# Patient Record
Sex: FEMALE | Race: OTHER | NOT HISPANIC OR LATINO
[De-identification: names, ages, dates, MRNs, and addresses within clinical notes are randomized per-mention and may not be internally consistent; named-entity substitution may affect disease eponyms.]

---

## 2022-04-14 ENCOUNTER — TRANSCRIPTION ENCOUNTER (OUTPATIENT)
Age: 37
End: 2022-04-14

## 2022-11-07 ENCOUNTER — NON-APPOINTMENT (OUTPATIENT)
Age: 37
End: 2022-11-07

## 2023-07-20 ENCOUNTER — INPATIENT (INPATIENT)
Facility: HOSPITAL | Age: 38
LOS: 2 days | Discharge: HOME | End: 2023-07-23
Attending: OBSTETRICS & GYNECOLOGY | Admitting: OBSTETRICS & GYNECOLOGY
Payer: MEDICAID

## 2023-07-20 VITALS
TEMPERATURE: 98 F | RESPIRATION RATE: 20 BRPM | HEART RATE: 82 BPM | SYSTOLIC BLOOD PRESSURE: 137 MMHG | DIASTOLIC BLOOD PRESSURE: 90 MMHG

## 2023-07-20 DIAGNOSIS — Z98.890 OTHER SPECIFIED POSTPROCEDURAL STATES: Chronic | ICD-10-CM

## 2023-07-20 DIAGNOSIS — O48 LATE PREGNANCY: ICD-10-CM

## 2023-07-20 LAB
ABO RH CONFIRMATION: SIGNIFICANT CHANGE UP
ALBUMIN SERPL ELPH-MCNC: 3.6 G/DL — SIGNIFICANT CHANGE UP (ref 3.3–5.2)
ALP SERPL-CCNC: 147 U/L — HIGH (ref 40–120)
ALT FLD-CCNC: 17 U/L — SIGNIFICANT CHANGE UP
ANION GAP SERPL CALC-SCNC: 15 MMOL/L — SIGNIFICANT CHANGE UP (ref 5–17)
APPEARANCE UR: CLEAR — SIGNIFICANT CHANGE UP
APTT BLD: 27.7 SEC — SIGNIFICANT CHANGE UP (ref 27.5–35.5)
AST SERPL-CCNC: 33 U/L — HIGH
BACTERIA # UR AUTO: ABNORMAL
BASOPHILS # BLD AUTO: 0.02 K/UL — SIGNIFICANT CHANGE UP (ref 0–0.2)
BASOPHILS NFR BLD AUTO: 0.2 % — SIGNIFICANT CHANGE UP (ref 0–2)
BILIRUB SERPL-MCNC: 0.3 MG/DL — LOW (ref 0.4–2)
BILIRUB UR-MCNC: NEGATIVE — SIGNIFICANT CHANGE UP
BLD GP AB SCN SERPL QL: SIGNIFICANT CHANGE UP
BUN SERPL-MCNC: 16.4 MG/DL — SIGNIFICANT CHANGE UP (ref 8–20)
CALCIUM SERPL-MCNC: 9.5 MG/DL — SIGNIFICANT CHANGE UP (ref 8.4–10.5)
CHLORIDE SERPL-SCNC: 100 MMOL/L — SIGNIFICANT CHANGE UP (ref 96–108)
CO2 SERPL-SCNC: 20 MMOL/L — LOW (ref 22–29)
COLOR SPEC: YELLOW — SIGNIFICANT CHANGE UP
CREAT ?TM UR-MCNC: 29 MG/DL — SIGNIFICANT CHANGE UP
CREAT SERPL-MCNC: 0.7 MG/DL — SIGNIFICANT CHANGE UP (ref 0.5–1.3)
DIFF PNL FLD: ABNORMAL
EGFR: 113 ML/MIN/1.73M2 — SIGNIFICANT CHANGE UP
EOSINOPHIL # BLD AUTO: 0.04 K/UL — SIGNIFICANT CHANGE UP (ref 0–0.5)
EOSINOPHIL NFR BLD AUTO: 0.4 % — SIGNIFICANT CHANGE UP (ref 0–6)
EPI CELLS # UR: ABNORMAL
FIBRINOGEN PPP-MCNC: 596 MG/DL — HIGH (ref 200–450)
GLUCOSE SERPL-MCNC: 94 MG/DL — SIGNIFICANT CHANGE UP (ref 70–99)
GLUCOSE UR QL: NEGATIVE MG/DL — SIGNIFICANT CHANGE UP
HCT VFR BLD CALC: 39.4 % — SIGNIFICANT CHANGE UP (ref 34.5–45)
HGB BLD-MCNC: 13.5 G/DL — SIGNIFICANT CHANGE UP (ref 11.5–15.5)
HIV 1 & 2 AB SERPL IA.RAPID: SIGNIFICANT CHANGE UP
IMM GRANULOCYTES NFR BLD AUTO: 0.7 % — SIGNIFICANT CHANGE UP (ref 0–0.9)
INR BLD: 0.92 RATIO — SIGNIFICANT CHANGE UP (ref 0.88–1.16)
KETONES UR-MCNC: ABNORMAL
LDH SERPL L TO P-CCNC: 269 U/L — HIGH (ref 98–192)
LEUKOCYTE ESTERASE UR-ACNC: ABNORMAL
LYMPHOCYTES # BLD AUTO: 1.66 K/UL — SIGNIFICANT CHANGE UP (ref 1–3.3)
LYMPHOCYTES # BLD AUTO: 18.4 % — SIGNIFICANT CHANGE UP (ref 13–44)
MCHC RBC-ENTMCNC: 30.5 PG — SIGNIFICANT CHANGE UP (ref 27–34)
MCHC RBC-ENTMCNC: 34.3 GM/DL — SIGNIFICANT CHANGE UP (ref 32–36)
MCV RBC AUTO: 89.1 FL — SIGNIFICANT CHANGE UP (ref 80–100)
MONOCYTES # BLD AUTO: 0.69 K/UL — SIGNIFICANT CHANGE UP (ref 0–0.9)
MONOCYTES NFR BLD AUTO: 7.7 % — SIGNIFICANT CHANGE UP (ref 2–14)
NEUTROPHILS # BLD AUTO: 6.54 K/UL — SIGNIFICANT CHANGE UP (ref 1.8–7.4)
NEUTROPHILS NFR BLD AUTO: 72.6 % — SIGNIFICANT CHANGE UP (ref 43–77)
NITRITE UR-MCNC: NEGATIVE — SIGNIFICANT CHANGE UP
PH UR: 6 — SIGNIFICANT CHANGE UP (ref 5–8)
PLATELET # BLD AUTO: 133 K/UL — LOW (ref 150–400)
POTASSIUM SERPL-MCNC: 4.2 MMOL/L — SIGNIFICANT CHANGE UP (ref 3.5–5.3)
POTASSIUM SERPL-SCNC: 4.2 MMOL/L — SIGNIFICANT CHANGE UP (ref 3.5–5.3)
PROT ?TM UR-MCNC: 5 MG/DL — SIGNIFICANT CHANGE UP (ref 0–12)
PROT SERPL-MCNC: 6.8 G/DL — SIGNIFICANT CHANGE UP (ref 6.6–8.7)
PROT UR-MCNC: NEGATIVE — SIGNIFICANT CHANGE UP
PROT/CREAT UR-RTO: 0.2 RATIO — SIGNIFICANT CHANGE UP
PROTHROM AB SERPL-ACNC: 10.7 SEC — SIGNIFICANT CHANGE UP (ref 10.5–13.4)
RBC # BLD: 4.42 M/UL — SIGNIFICANT CHANGE UP (ref 3.8–5.2)
RBC # FLD: 13.9 % — SIGNIFICANT CHANGE UP (ref 10.3–14.5)
RBC CASTS # UR COMP ASSIST: SIGNIFICANT CHANGE UP /HPF (ref 0–4)
SODIUM SERPL-SCNC: 135 MMOL/L — SIGNIFICANT CHANGE UP (ref 135–145)
SP GR SPEC: 1.01 — SIGNIFICANT CHANGE UP (ref 1.01–1.02)
URATE SERPL-MCNC: 6.1 MG/DL — HIGH (ref 2.4–5.7)
UROBILINOGEN FLD QL: NEGATIVE MG/DL — SIGNIFICANT CHANGE UP
WBC # BLD: 9.01 K/UL — SIGNIFICANT CHANGE UP (ref 3.8–10.5)
WBC # FLD AUTO: 9.01 K/UL — SIGNIFICANT CHANGE UP (ref 3.8–10.5)
WBC UR QL: ABNORMAL /HPF (ref 0–5)

## 2023-07-20 RX ORDER — FAMOTIDINE 10 MG/ML
20 INJECTION INTRAVENOUS ONCE
Refills: 0 | Status: COMPLETED | OUTPATIENT
Start: 2023-07-20 | End: 2023-07-20

## 2023-07-20 RX ORDER — OXYTOCIN 10 UNIT/ML
VIAL (ML) INJECTION
Qty: 30 | Refills: 0 | Status: DISCONTINUED | OUTPATIENT
Start: 2023-07-20 | End: 2023-07-21

## 2023-07-20 RX ORDER — CITRIC ACID/SODIUM CITRATE 300-500 MG
30 SOLUTION, ORAL ORAL ONCE
Refills: 0 | Status: DISCONTINUED | OUTPATIENT
Start: 2023-07-20 | End: 2023-07-21

## 2023-07-20 RX ORDER — SODIUM CHLORIDE 9 MG/ML
1000 INJECTION, SOLUTION INTRAVENOUS
Refills: 0 | Status: DISCONTINUED | OUTPATIENT
Start: 2023-07-20 | End: 2023-07-21

## 2023-07-20 RX ORDER — OXYTOCIN 10 UNIT/ML
333.33 VIAL (ML) INJECTION
Qty: 20 | Refills: 0 | Status: COMPLETED | OUTPATIENT
Start: 2023-07-20 | End: 2023-07-20

## 2023-07-20 RX ORDER — CHLORHEXIDINE GLUCONATE 213 G/1000ML
1 SOLUTION TOPICAL DAILY
Refills: 0 | Status: DISCONTINUED | OUTPATIENT
Start: 2023-07-20 | End: 2023-07-21

## 2023-07-20 RX ADMIN — SODIUM CHLORIDE 125 MILLILITER(S): 9 INJECTION, SOLUTION INTRAVENOUS at 19:01

## 2023-07-20 RX ADMIN — FAMOTIDINE 20 MILLIGRAM(S): 10 INJECTION INTRAVENOUS at 22:06

## 2023-07-20 RX ADMIN — Medication 2 MILLIUNIT(S)/MIN: at 19:02

## 2023-07-20 NOTE — OB PROVIDER H&P - ASSESSMENT
A/P:   -Admit to L&D  -Consent  -Admission labs  -NPO, except ice chips   -IV fluids  -Labor: Intact.   -Fetus: Cat I tracing. Continuous toco and fetal monitoring.   -GBS: Negative, no GBS ppx required   -Analgesia:   -DVT ppx: Ambulate and SCD's while in bed     Discussed with Dr. Worrell   A/P: Yvette Gilbert is a 38y  at 39w6d GA by LMP consistent with trimester sono who presents to L&D for elective induction of labor.    -Admit to L&D  -Consent  -Admission labs  -IV fluids  -Fetus: Cat I tracing. Continuous toco and fetal monitoring.   -GBS: Negative, no GBS ppx required   -DVT ppx: Ambulate and SCD's while in bed     Discussed with Dr. Worrell   A/P: Yvette Gilbert is a 38y  at 39w6d GA by LMP consistent with trimester sono who presents to L&D for induction of labor due to gestational hypertension.    -Admit to L&D  -Consent  -Admission labs  -IV fluids  -Fetus: Cat I tracing. Continuous toco and fetal monitoring.   -GBS: Negative, no GBS ppx required   -DVT ppx: Ambulate and SCD's while in bed     Discussed with Dr. Worrell

## 2023-07-20 NOTE — OB PROVIDER H&P - HISTORY OF PRESENT ILLNESS
Yvette Gilbert is a 38y GP at  weeks GA by LMP consistent with trimester sono who presents to L&D for induction of labor.     DILSHAD: 7/21/23  LMP: 10/13/22    Prenatal course uncomplicated.      Patient denies vaginal bleeding and leakage of fluid. She endorses good fetal movement and infrequent contractions. Denies fevers, chills, nausea and vomiting. No other complaints at this time.     POB:  PGYN: -fibroids, -ovarian cysts, denies STD hx, denies abnormal PAPs   PMH: GERD and seasonal allergies  PSH: hernia repair in 2004, no complications and tolerated anesthesia well   SH: Denies EtOH, tobacco and illicit drug use during this pregnancy; feels safe at home and has good access to medical care  Meds: PNVs, loratadine for allergies, famotidine for GERD  Allergies: NKDA    BMI:  Sono:  EFW: Yvette Gilbert is a 38y GP at 39w6d GA by LMP consistent with trimester sono who presents to L&D for elective induction of labor.     DILSHAD: 7/21/23  LMP: 10/13/22    Prenatal course uncomplicated.      Patient denies vaginal bleeding and leakage of fluid. She endorses good fetal movement and infrequent contractions. Denies fevers, chills, nausea and vomiting. She reports intermittent 5/10 pain headaches over the last week that last a few minutes - these headaches improve with hydration. There is no associated abdominal pain or change in vision. She also reports leg swelling over the last three weeks worsened by prolonged sitting and improved with walking around - she denies pain.     POB:  PGYN: -fibroids, -ovarian cysts, denies STD hx, denies abnormal PAPs   PMH: GERD and seasonal allergies  PSH: hernia repair in 2004, no complications and tolerated anesthesia well   SH: Denies EtOH, tobacco and illicit drug use during this pregnancy; feels safe at home and has good access to medical care  Meds: PNVs, loratadine for allergies, famotidine for GERD  Allergies: NKDA    BMI: 22.3  Sono: __  EFW: __ Yvette Gilbert is a 38y  at 39w6d GA by LMP consistent with trimester sono who presents to L&D for elective induction of labor. Patient denies vaginal bleeding and leakage of fluid. She endorses good fetal movement and infrequent contractions. Denies fevers, chills, nausea and vomiting. She reports intermittent 5/10 pain headaches over the last week that last a few minutes - these headaches improve with hydration. There is no associated abdominal pain or change in vision. She also reports leg swelling over the last three weeks worsened by prolonged sitting and improved with walking around - she denies pain.     DILSHAD: 23  LMP: 10/14/22    Prenatal course c/b:  AMA      POB:    PGYN: -fibroids, -ovarian cysts, denies STD hx, denies abnormal PAPs   PMH: GERD and seasonal allergies  PSH: hernia repair in , no complications and tolerated anesthesia well   SH: Denies EtOH, tobacco and illicit drug use during this pregnancy; feels safe at home and has good access to medical care  Meds: PNVs, loratadine for allergies, famotidine for GERD  Allergies: NKDA    BMI: 22.3  Sono: Cephalic, placenta anterior (23)  EFW: 3326g Yvette Gilbert is a 38y  at 39w6d GA by LMP consistent with trimester sono who presents to L&D for induction of labor due to gestational hypertension. Patient denies vaginal bleeding and leakage of fluid. She endorses good fetal movement and infrequent contractions. Denies fevers, chills, nausea and vomiting. She reports intermittent 5/10 pain headaches over the last week that last a few minutes - these headaches improve with hydration. There is no associated abdominal pain or change in vision. She also reports leg swelling over the last three weeks worsened by prolonged sitting and improved with walking around - she denies pain.     DILSHAD: 23  LMP: 10/14/22    Prenatal course with Dr. Aly c/b:  AMA      POB:    PGYN: -fibroids, -ovarian cysts, denies STD hx, denies abnormal PAPs   PMH: GERD and seasonal allergies  PSH: hernia repair in , no complications and tolerated anesthesia well   SH: Denies EtOH, tobacco and illicit drug use during this pregnancy; feels safe at home and has good access to medical care  Meds: PNVs, loratadine for allergies, famotidine for GERD  Allergies: NKDA    BMI: 22.3  Sono: Cephalic, placenta anterior (23)  EFW: 3326g

## 2023-07-20 NOTE — OB PROVIDER H&P - ATTENDING COMMENTS
38y  at 39w6d GA presents to L&D for induction of labor due to gestational hypertension with no s/s pre-eclampsia, reassuring fetal testing. Consent for care signed after questions answered.

## 2023-07-20 NOTE — OB PROVIDER H&P - NSHPLABSRESULTS_GEN_ALL_CORE
13.5   9.01  )-----------( 133      ( 20 Jul 2023 18:08 )             39.4       07-20    135  |  100  |  16.4  ----------------------------<  94  4.2   |  20.0<L>  |  0.70    Ca    9.5      20 Jul 2023 18:08    TPro  6.8  /  Alb  3.6  /  TBili  0.3<L>  /  DBili  x   /  AST  33<H>  /  ALT  17  /  AlkPhos  147<H>  07-20    Urinalysis Basic - ( 20 Jul 2023 18:08 )    Color: x / Appearance: x / SG: x / pH: x  Gluc: 94 mg/dL / Ketone: x  / Bili: x / Urobili: x   Blood: x / Protein: x / Nitrite: x   Leuk Esterase: x / RBC: x / WBC x   Sq Epi: x / Non Sq Epi: x / Bacteria: x        PT/INR - ( 20 Jul 2023 18:08 )   PT: 10.7 sec;   INR: 0.92 ratio         PTT - ( 20 Jul 2023 18:08 )  PTT:27.7 sec

## 2023-07-20 NOTE — OB PROVIDER H&P - NSHPPHYSICALEXAM_GEN_ALL_CORE
HR: 86 (07-20-23 @ 17:30) (82 - 86)  BP: 143/97 (07-20-23 @ 17:30) (133/89 - 143/97)    Gen: NAD, well-appearing   Resp: lungs CTA b/l  CV: S1 S2 RRR no murmurs rubs gallops  Abd: Soft, gravid  Ext: non-tender, non-edematous  SVE:  2/50/-3    Bedside sono:  FHT:  [place]: HR: 86 (07-20-23 @ 17:30) (82 - 86)  BP: 143/97 (07-20-23 @ 17:30) (133/89 - 143/97)    Gen: NAD, well-appearing   Resp: lungs CTA b/l  CV: S1 S2 RRR no murmurs rubs gallops  Abd: Soft, gravid  SVE:  2/50/-3    Bedside sono:  FHT:  [place]: HR: 86 (07-20-23 @ 17:30) (82 - 86)  BP: 143/97 (07-20-23 @ 17:30) (133/89 - 143/97)    Gen: NAD, well-appearing   Resp: lungs CTA b/l  CV: S1 S2 RRR no murmurs rubs gallops  Abd: Soft, gravid  SVE:  2/50/-3    FHT: Cat I  Tice: No contractions

## 2023-07-21 ENCOUNTER — TRANSCRIPTION ENCOUNTER (OUTPATIENT)
Age: 38
End: 2023-07-21

## 2023-07-21 LAB
COVID-19 SPIKE DOMAIN AB INTERP: POSITIVE
COVID-19 SPIKE DOMAIN ANTIBODY RESULT: >250 U/ML — HIGH
HIV 1+2 AB+HIV1 P24 AG SERPL QL IA: SIGNIFICANT CHANGE UP
MEV IGG SER-ACNC: >300 AU/ML — SIGNIFICANT CHANGE UP
MEV IGG+IGM SER-IMP: POSITIVE — SIGNIFICANT CHANGE UP
SARS-COV-2 IGG+IGM SERPL QL IA: >250 U/ML — HIGH
SARS-COV-2 IGG+IGM SERPL QL IA: POSITIVE
T PALLIDUM AB TITR SER: NEGATIVE — SIGNIFICANT CHANGE UP

## 2023-07-21 PROCEDURE — 59409 OBSTETRICAL CARE: CPT | Mod: U9

## 2023-07-21 RX ORDER — SODIUM CHLORIDE 9 MG/ML
3 INJECTION INTRAMUSCULAR; INTRAVENOUS; SUBCUTANEOUS EVERY 8 HOURS
Refills: 0 | Status: DISCONTINUED | OUTPATIENT
Start: 2023-07-21 | End: 2023-07-23

## 2023-07-21 RX ORDER — LANOLIN
1 OINTMENT (GRAM) TOPICAL EVERY 6 HOURS
Refills: 0 | Status: DISCONTINUED | OUTPATIENT
Start: 2023-07-21 | End: 2023-07-23

## 2023-07-21 RX ORDER — PRAMOXINE HYDROCHLORIDE 150 MG/15G
1 AEROSOL, FOAM RECTAL EVERY 4 HOURS
Refills: 0 | Status: DISCONTINUED | OUTPATIENT
Start: 2023-07-21 | End: 2023-07-23

## 2023-07-21 RX ORDER — DIBUCAINE 1 %
1 OINTMENT (GRAM) RECTAL EVERY 6 HOURS
Refills: 0 | Status: DISCONTINUED | OUTPATIENT
Start: 2023-07-21 | End: 2023-07-23

## 2023-07-21 RX ORDER — SIMETHICONE 80 MG/1
80 TABLET, CHEWABLE ORAL EVERY 4 HOURS
Refills: 0 | Status: DISCONTINUED | OUTPATIENT
Start: 2023-07-21 | End: 2023-07-23

## 2023-07-21 RX ORDER — IBUPROFEN 200 MG
600 TABLET ORAL EVERY 6 HOURS
Refills: 0 | Status: DISCONTINUED | OUTPATIENT
Start: 2023-07-21 | End: 2023-07-23

## 2023-07-21 RX ORDER — OXYTOCIN 10 UNIT/ML
41.67 VIAL (ML) INJECTION
Qty: 20 | Refills: 0 | Status: DISCONTINUED | OUTPATIENT
Start: 2023-07-21 | End: 2023-07-23

## 2023-07-21 RX ORDER — IBUPROFEN 200 MG
600 TABLET ORAL EVERY 6 HOURS
Refills: 0 | Status: COMPLETED | OUTPATIENT
Start: 2023-07-21 | End: 2024-06-18

## 2023-07-21 RX ORDER — AER TRAVELER 0.5 G/1
1 SOLUTION RECTAL; TOPICAL EVERY 4 HOURS
Refills: 0 | Status: DISCONTINUED | OUTPATIENT
Start: 2023-07-21 | End: 2023-07-23

## 2023-07-21 RX ORDER — ACETAMINOPHEN 500 MG
975 TABLET ORAL
Refills: 0 | Status: DISCONTINUED | OUTPATIENT
Start: 2023-07-21 | End: 2023-07-23

## 2023-07-21 RX ORDER — TETANUS TOXOID, REDUCED DIPHTHERIA TOXOID AND ACELLULAR PERTUSSIS VACCINE, ADSORBED 5; 2.5; 8; 8; 2.5 [IU]/.5ML; [IU]/.5ML; UG/.5ML; UG/.5ML; UG/.5ML
0.5 SUSPENSION INTRAMUSCULAR ONCE
Refills: 0 | Status: DISCONTINUED | OUTPATIENT
Start: 2023-07-21 | End: 2023-07-23

## 2023-07-21 RX ORDER — HYDROCORTISONE 1 %
1 OINTMENT (GRAM) TOPICAL EVERY 6 HOURS
Refills: 0 | Status: DISCONTINUED | OUTPATIENT
Start: 2023-07-21 | End: 2023-07-23

## 2023-07-21 RX ORDER — MAGNESIUM HYDROXIDE 400 MG/1
30 TABLET, CHEWABLE ORAL
Refills: 0 | Status: DISCONTINUED | OUTPATIENT
Start: 2023-07-21 | End: 2023-07-23

## 2023-07-21 RX ORDER — BENZOCAINE 10 %
1 GEL (GRAM) MUCOUS MEMBRANE EVERY 6 HOURS
Refills: 0 | Status: DISCONTINUED | OUTPATIENT
Start: 2023-07-21 | End: 2023-07-23

## 2023-07-21 RX ORDER — DIPHENHYDRAMINE HCL 50 MG
25 CAPSULE ORAL EVERY 6 HOURS
Refills: 0 | Status: DISCONTINUED | OUTPATIENT
Start: 2023-07-21 | End: 2023-07-23

## 2023-07-21 RX ORDER — KETOROLAC TROMETHAMINE 30 MG/ML
30 SYRINGE (ML) INJECTION ONCE
Refills: 0 | Status: DISCONTINUED | OUTPATIENT
Start: 2023-07-21 | End: 2023-07-21

## 2023-07-21 RX ORDER — OXYCODONE HYDROCHLORIDE 5 MG/1
5 TABLET ORAL
Refills: 0 | Status: DISCONTINUED | OUTPATIENT
Start: 2023-07-21 | End: 2023-07-23

## 2023-07-21 RX ORDER — OXYCODONE HYDROCHLORIDE 5 MG/1
5 TABLET ORAL ONCE
Refills: 0 | Status: DISCONTINUED | OUTPATIENT
Start: 2023-07-21 | End: 2023-07-23

## 2023-07-21 RX ADMIN — Medication 1000 MILLIUNIT(S)/MIN: at 08:46

## 2023-07-21 RX ADMIN — SODIUM CHLORIDE 125 MILLILITER(S): 9 INJECTION, SOLUTION INTRAVENOUS at 03:32

## 2023-07-21 RX ADMIN — Medication 600 MILLIGRAM(S): at 17:14

## 2023-07-21 RX ADMIN — Medication 30 MILLIGRAM(S): at 10:36

## 2023-07-21 RX ADMIN — Medication 975 MILLIGRAM(S): at 20:58

## 2023-07-21 RX ADMIN — Medication 1 TABLET(S): at 12:41

## 2023-07-21 RX ADMIN — SODIUM CHLORIDE 3 MILLILITER(S): 9 INJECTION INTRAMUSCULAR; INTRAVENOUS; SUBCUTANEOUS at 14:11

## 2023-07-21 RX ADMIN — Medication 975 MILLIGRAM(S): at 14:32

## 2023-07-21 NOTE — DISCHARGE NOTE OB - NS AS DC AMI YN
Caller would like to discuss an/a appointment for labs/medication. Writer advised caller of callback within 24-72 hours.    Patient Name: Digna Good  Caller Name: Patient  Name of Facility: [facility]  Callback Number: [phone] (home)   Best Availability: anytime  Can A Detailed Message Be left? yes  Fax Number: na  Additional Info: Patient would like to speak to MD.   Did you confirm the message with the caller?: yes    Thank you,  Norah Abdul   no

## 2023-07-21 NOTE — OB RN DELIVERY SUMMARY - NS_SEPSISRSKCALC_OBGYN_ALL_OB_FT
EOS calculated successfully. EOS Risk Factor: 0.5/1000 live births ([facility] national incidence); GA=40w1d; Temp=98.6; ROM=8.417; GBS='Negative'; Antibiotics='No antibiotics or any antibiotics < 2 hrs prior to birth'

## 2023-07-21 NOTE — DISCHARGE NOTE OB - CARE PLAN
Principal Discharge DX:	Vaginal delivery  Assessment and plan of treatment:	Patient should transition to regular activity level. Resume regular diet. Patient should follow up with her OB for postpartum checkup in 2-3 weeks. Patient should call her doctor sooner if she develops fever or uncontrolled vaginal bleeding. Take medications as directed. Exclusive breast feeding for the first 6 months is recommended. Nothing per vagina for 6 weeks (incl. sex, douching, etc). If you have additional concerns, please inform your provider.   1

## 2023-07-21 NOTE — OB RN DELIVERY SUMMARY - NSBEGANLABOR_OBGYN_A_OB
While in Labor & Delivery Topical Clindamycin Pregnancy And Lactation Text: This medication is Pregnancy Category B and is considered safe during pregnancy. It is unknown if it is excreted in breast milk.

## 2023-07-21 NOTE — OB PROVIDER LABOR PROGRESS NOTE - ASSESSMENT
38 y.o.  at 40w1d undergoing IOL at term, on pitocin, SROM with clear fluid, epidural in place. Cat 1 FHT.    - continuous toco and fetal heart monitoring  - continue IOL with pitocin      D/w Dr. Worrell

## 2023-07-21 NOTE — DISCHARGE NOTE OB - HOSPITAL COURSE
Patient underwent a normal spontaneous vaginal delivery. Post-partum course was uncomplicated. Pain is well controlled with PRN medication. She has no difficulty with ambulation, voiding, or PO intake. Lab values and vital signs are within normal limits prior to discharge.

## 2023-07-21 NOTE — OB RN DELIVERY SUMMARY - NSSELHIDDEN_OBGYN_ALL_OB_FT
[NS_DeliveryAttending1_OBGYN_ALL_OB_FT:[id number]],[NS_DeliveryRN_OBGYN_ALL_OB_FT:[id number]=]

## 2023-07-21 NOTE — OB PROVIDER LABOR PROGRESS NOTE - ASSESSMENT
34 yo  IOL for gHTN  -overall reassuring FHT  -request epidural top off 39 yo  IOL for gHTN  -overall reassuring FHT  -request epidural top off

## 2023-07-21 NOTE — OB PROVIDER LABOR PROGRESS NOTE - NS_SUBJECTIVE/OBJECTIVE_OBGYN_ALL_OB_FT
Patient reports intermittent rectal pressure
Patient c/o painful contractions despite epidural
Patient reports rectal pressure and SROM at 0019

## 2023-07-21 NOTE — DISCHARGE NOTE OB - MEDICATION SUMMARY - MEDICATIONS TO TAKE
I will START or STAY ON the medications listed below when I get home from the hospital:    ibuprofen 600 mg oral tablet  -- 1 tab(s) by mouth every 6 hours, As Needed -for moderate pain MDD:4  -- Indication: For Pain    acetaminophen 325 mg oral tablet  -- 3 tab(s) by mouth every 6 hours, As Needed -for mild pain MDD:12  -- Indication: For Pain

## 2023-07-21 NOTE — DISCHARGE NOTE OB - NS MD DC FALL RISK RISK
For information on Fall & Injury Prevention, visit: [email or web address] OR  [email or web address] OR  https://www.cdc.gov/steadi/patient.html

## 2023-07-21 NOTE — OB PROVIDER DELIVERY SUMMARY - NSPROVIDERDELIVERYNOTE_OBGYN_ALL_OB_FT
Vaginal Delivery Summary    Procedure: Normal spontaneous vaginal delivery   Findings: Viable male infant delivered in cephalic presentation at 8:44, placenta delivered at 8:51.  Apgar scores 9 and 9 at 1 and 5 minutes.   Weight will be recorded after 1 hour to allow for skin-to-skin contact.  Laceration(s): 1st degree perineal  Repair: with suture  EBL: 300cc  Complications: none    Procedure:   Patient felt rectal pressure and was found to be fully dilated, +2 station. She pushed effectively. She delivered a viable male infant. A loose nuchal cord X 1 was reduced on delivery of the shoulders and delayed cord clamping was performed for 60 seconds. Placenta delivered intact and pitocin was started at the delivery of the placenta. Perineum and vagina were inspected, one 1st degree perineal laceration present and repaired. Adequate hemostasis was obtained. Fundus was noted to be firm.

## 2023-07-21 NOTE — DISCHARGE NOTE OB - PATIENT PORTAL LINK FT
You can access the FollowMyHealth Patient Portal offered by [facility] by registering at the following website: [email or web address]. By joining [facility]’s FollowMyHealth portal, you will also be able to view your health information using other applications (apps) compatible with our system.
05-Jun-2018 20:00

## 2023-07-21 NOTE — DISCHARGE NOTE OB - CARE PROVIDER_API CALL
Sherry Aly  Obstetrics and Gynecology  [address] [place] [address]  Phone: [phone]  Fax: [phone]  Follow Up Time:

## 2023-07-22 LAB
HCT VFR BLD CALC: 27.7 % — LOW (ref 34.5–45)
HGB BLD-MCNC: 9.2 G/DL — LOW (ref 11.5–15.5)

## 2023-07-22 RX ADMIN — Medication 975 MILLIGRAM(S): at 08:31

## 2023-07-22 RX ADMIN — Medication 600 MILLIGRAM(S): at 12:40

## 2023-07-22 RX ADMIN — Medication 975 MILLIGRAM(S): at 14:43

## 2023-07-22 RX ADMIN — Medication 1 TABLET(S): at 11:42

## 2023-07-22 RX ADMIN — Medication 975 MILLIGRAM(S): at 02:57

## 2023-07-22 RX ADMIN — Medication 975 MILLIGRAM(S): at 21:19

## 2023-07-22 RX ADMIN — Medication 600 MILLIGRAM(S): at 23:57

## 2023-07-22 RX ADMIN — Medication 600 MILLIGRAM(S): at 17:55

## 2023-07-22 RX ADMIN — Medication 975 MILLIGRAM(S): at 09:30

## 2023-07-22 RX ADMIN — Medication 600 MILLIGRAM(S): at 18:52

## 2023-07-22 RX ADMIN — Medication 600 MILLIGRAM(S): at 11:42

## 2023-07-22 RX ADMIN — Medication 600 MILLIGRAM(S): at 00:47

## 2023-07-22 RX ADMIN — Medication 975 MILLIGRAM(S): at 15:40

## 2023-07-23 VITALS
HEART RATE: 79 BPM | TEMPERATURE: 98 F | DIASTOLIC BLOOD PRESSURE: 69 MMHG | OXYGEN SATURATION: 98 % | SYSTOLIC BLOOD PRESSURE: 114 MMHG | RESPIRATION RATE: 18 BRPM

## 2023-07-23 DIAGNOSIS — D62 ACUTE POSTHEMORRHAGIC ANEMIA: ICD-10-CM

## 2023-07-23 DIAGNOSIS — O13.3 GESTATIONAL [PREGNANCY-INDUCED] HYPERTENSION WITHOUT SIGNIFICANT PROTEINURIA, THIRD TRIMESTER: ICD-10-CM

## 2023-07-23 PROCEDURE — 80053 COMPREHEN METABOLIC PANEL: CPT

## 2023-07-23 PROCEDURE — 86703 HIV-1/HIV-2 1 RESULT ANTBDY: CPT

## 2023-07-23 PROCEDURE — 85610 PROTHROMBIN TIME: CPT

## 2023-07-23 PROCEDURE — 81001 URINALYSIS AUTO W/SCOPE: CPT

## 2023-07-23 PROCEDURE — 87389 HIV-1 AG W/HIV-1&-2 AB AG IA: CPT

## 2023-07-23 PROCEDURE — 85384 FIBRINOGEN ACTIVITY: CPT

## 2023-07-23 PROCEDURE — 82570 ASSAY OF URINE CREATININE: CPT

## 2023-07-23 PROCEDURE — 86900 BLOOD TYPING SEROLOGIC ABO: CPT

## 2023-07-23 PROCEDURE — 85018 HEMOGLOBIN: CPT

## 2023-07-23 PROCEDURE — 86901 BLOOD TYPING SEROLOGIC RH(D): CPT

## 2023-07-23 PROCEDURE — 86850 RBC ANTIBODY SCREEN: CPT

## 2023-07-23 PROCEDURE — 86769 SARS-COV-2 COVID-19 ANTIBODY: CPT

## 2023-07-23 PROCEDURE — 85025 COMPLETE CBC W/AUTO DIFF WBC: CPT

## 2023-07-23 PROCEDURE — 84156 ASSAY OF PROTEIN URINE: CPT

## 2023-07-23 PROCEDURE — 86765 RUBEOLA ANTIBODY: CPT

## 2023-07-23 PROCEDURE — 84550 ASSAY OF BLOOD/URIC ACID: CPT

## 2023-07-23 PROCEDURE — 36415 COLL VENOUS BLD VENIPUNCTURE: CPT

## 2023-07-23 PROCEDURE — 85730 THROMBOPLASTIN TIME PARTIAL: CPT

## 2023-07-23 PROCEDURE — 83615 LACTATE (LD) (LDH) ENZYME: CPT

## 2023-07-23 PROCEDURE — 85014 HEMATOCRIT: CPT

## 2023-07-23 PROCEDURE — 86780 TREPONEMA PALLIDUM: CPT

## 2023-07-23 RX ADMIN — Medication 975 MILLIGRAM(S): at 10:00

## 2023-07-23 RX ADMIN — Medication 975 MILLIGRAM(S): at 03:05

## 2023-07-23 RX ADMIN — Medication 1 TABLET(S): at 11:40

## 2023-07-23 RX ADMIN — Medication 600 MILLIGRAM(S): at 12:40

## 2023-07-23 RX ADMIN — Medication 600 MILLIGRAM(S): at 11:41

## 2023-07-23 RX ADMIN — Medication 975 MILLIGRAM(S): at 09:02

## 2023-07-23 RX ADMIN — Medication 600 MILLIGRAM(S): at 06:22

## 2023-07-23 NOTE — PROGRESS NOTE ADULT - ATTENDING COMMENTS
38y  now PPD#1 s/p spontaneous vaginal delivery at 40w1d gestation, uncomplicated.  - currently with minimal bleeding/lochia only, no symptoms of anemia, post partum Hgb 9.2, consistent with acute blood loss anemia, plan for PO Fe  - healthy male infant at bedside, desires circumcision  - vital signs, lab results, and physical exam reassuring   - DVT PPX: ambulation  - anticipated discharge: will re-evaluate later for discharge, patient thinking about staying until tomorrow
38y  now stable PPD#2 s/p spontaneous vaginal delivery, asymptomatic acute blood loss anemia. Discharge instructions reviewed including risk of postpartum pre-eclampsia, will book blood pressure checks in office. Consent for circumcision signed after questions answered.

## 2023-07-23 NOTE — PROGRESS NOTE ADULT - SUBJECTIVE AND OBJECTIVE BOX
MARY CARMEN JENNINGS is a 38y  now PPD#2 s/p spontaneous vaginal delivery at 40w1d gestation, uncomplicated.    S:    The patient has no complaints.  Pain controlled with current treatment regimen.   She is ambulating without difficulty and tolerating PO.   + flatus/-BM/+ voiding   She endorses appropriate lochia, which is decreasing.   Patient desires to go home.     O:    T(C): 36.6 (23 @ 04:52), Max: 36.7 (23 @ 17:45)  HR: 79 (23 @ 04:52) (79 - 84)  BP: 114/69 (23 @ 04:52) (110/74 - 123/79)  RR: 18 (23 @ 04:52) (16 - 18)  SpO2: 98% (23 @ 04:52) (96% - 98%)    Gen: NAD, AOx3  CV: RRR  Pulm: CTAB  Breast: Nontender, non-engorged   Abdomen:  Soft, non-tender, non-distended, +bowel sounds  Uterus:  Fundus firm below umbilicus  VE:  +Lochia  Ext:  Non-tender and non-edematous                          9.2    x     )-----------( x        ( 2023 05:30 )             27.7             
MARY CARMEN JENNINGS is a 38y  now PPD#1 s/p spontaneous vaginal delivery at 40w1d gestation, uncomplicated.    S:    No acute events overnight.   The patient has no complaints.  Pain controlled with current treatment regimen.   She is ambulating without difficulty and tolerating PO.   + flatus/-BM/+ voiding   She endorses appropriate lochia, which is decreasing.   She is breastfeeding without difficulty.   She denies fevers, chills, nausea and vomiting.   She denies lightheadedness, dizziness, palpitations, chest pain and SOB.     O:    T(C): 36.7 (23 @ 05:50), Max: 37.0 (23 @ 08:12)  HR: 86 (23 @ 05:50) (73 - 106)  BP: 111/74 (23 @ 05:50) (105/66 - 154/78)  RR: 18 (23 @ 05:50) (16 - 18)  SpO2: 98% (23 @ 05:50) (96% - 98%)    Gen: NAD, AOx3, resting comfortably on room air   Abdomen:  Soft, non-tender, non-distended  Uterus:  Fundus firm below umbilicus  VE:  Expected lochia  Ext:  b/l LE non-tender                           13.5   9.01  )-----------( 133      ( 2023 18:08 )             39.4         135  |  100  |  16.4  ----------------------------<  94  4.2   |  20.0<L>  |  0.70    Ca    9.5      2023 18:08    TPro  6.8  /  Alb  3.6  /  TBili  0.3<L>  /  DBili  x   /  AST  33<H>  /  ALT  17  /  AlkPhos  147<H>

## 2023-07-23 NOTE — PROGRESS NOTE ADULT - ASSESSMENT
A/P:    -Vital signs stable  -Hgb: 13.5 > AM labs pending   -Voiding, tolerating PO  -Advance care as tolerated   -Continue routine postpartum care and education  -Healthy male infant, desires circ    -Dispo: Patient to be discharged when meeting all postpartum milestones and pending attending approval.   
A/P:  38y  now PPD#2 s/p spontaneous vaginal delivery at 40w1d gestation, uncomplicated.  -Vital signs stable  -Hgb: 9.2, asymptomatic   -Voiding, tolerating PO, bowel function nml   -Advance care as tolerated   -Continue routine postpartum care and education  -Healthy male infant, desires circumcision  -Dispo: Pt is stable for discharge to home pending attending approval.

## 2023-07-24 PROBLEM — Z98.890 OTHER SPECIFIED POSTPROCEDURAL STATES: Chronic | Status: ACTIVE | Noted: 2023-07-20

## 2023-07-26 DIAGNOSIS — D62 ACUTE POSTHEMORRHAGIC ANEMIA: ICD-10-CM

## 2023-07-26 PROBLEM — Z00.00 ENCOUNTER FOR PREVENTIVE HEALTH EXAMINATION: Status: ACTIVE | Noted: 2023-07-26

## 2023-08-03 ENCOUNTER — APPOINTMENT (OUTPATIENT)
Dept: CARDIOLOGY | Facility: CLINIC | Age: 38
End: 2023-08-03
Payer: MEDICAID

## 2023-08-03 VITALS
OXYGEN SATURATION: 97 % | DIASTOLIC BLOOD PRESSURE: 78 MMHG | SYSTOLIC BLOOD PRESSURE: 116 MMHG | HEART RATE: 105 BPM | HEIGHT: 64 IN | BODY MASS INDEX: 24.24 KG/M2 | TEMPERATURE: 97.8 F | WEIGHT: 142 LBS

## 2023-08-03 PROCEDURE — 99203 OFFICE O/P NEW LOW 30 MIN: CPT | Mod: 25

## 2023-08-03 PROCEDURE — 93000 ELECTROCARDIOGRAM COMPLETE: CPT

## 2023-08-03 RX ORDER — LORATADINE 10 MG/1
10 TABLET ORAL
Refills: 0 | Status: ACTIVE | COMMUNITY

## 2023-08-03 NOTE — DISCUSSION/SUMMARY
[FreeTextEntry1] : 38F  recent postpartum vaginal delivered on 23 at 40 weeks gestation with intrapartum hypertension?, discharged home normotensive, refer for cardiology evaluation.   Remains normotensive, no evidence of preeclampsia, family history of early onset HTN advised life style modifications with low salt diet and routine exercise. EKG within normal, no cardiac complains, no further cardiac testing indicated.  -advised annual physical with PCP to monitor BP.    Follow up as needed.  [EKG obtained to assist in diagnosis and management of assessed problem(s)] : EKG obtained to assist in diagnosis and management of assessed problem(s)

## 2023-08-03 NOTE — HISTORY OF PRESENT ILLNESS
[FreeTextEntry1] : 38F  recent postpartum vaginal delivered on 23 at 40 weeks gestation with intrapartum hypertension?, discharged home normotensive, refer for cardiology evaluation.   Reports feeling, denies cardiac complains, she did not have any issue with her BP during her pregnancy, has not been checking her BP at home, not yet seen her OB, no LE edema. Prior pregnancy 6 years ago no issue with her BP.   Nonsmoker, social alcohol Mother with HTN age 40s Work as pharmacy technician

## 2023-09-19 NOTE — OB PROVIDER IHI INDUCTION/AUGMENTATION NOTE - NS_EFFACEMANT_OBGYN_ALL_OB_NU
COVID 2 Telephone Call Attempt    Discharge Date: 9/11/23  Discharge Location: [facility]: [facility]    Call Attempt Date: 9/19/2023  Call Attempt: Second   60

## 2024-05-07 NOTE — OB PROVIDER IHI INDUCTION/AUGMENTATION NOTE - NS_OBIHIADDITIONDETAILS_OBGYN_ALL_OB_FT
Subjective   Simin is a 5 year old female who is accompanied by:mother who is the primary historian.     Well Child Assessment:    Elimination  Elimination problems do not include diarrhea.       HPI 5 year old NP here for wellness visit. She has no pmh. She wears glasses for the passed year for astigmatism. She is currently in  and doing well. Mother states she eats regular meals. No concerns from her previous pediatrician.   Additional concerns today: None     Review of Systems   Constitutional:  Negative for appetite change and fever.   HENT:  Negative for ear pain and rhinorrhea.    Eyes:         Wears glasses   Respiratory:  Negative for cough.    Cardiovascular:  Negative for chest pain.   Gastrointestinal:  Negative for abdominal pain, diarrhea and vomiting.   Genitourinary:  Negative for difficulty urinating and dysuria.   Musculoskeletal:  Negative for gait problem.   Skin:  Negative for rash.   Neurological:  Negative for dizziness.       Patient's medications, allergies, past medical, surgical, social and family histories were reviewed and updated as appropriate.    Objective   BP 95/55   Pulse 109   Temp 97.6 °F (36.4 °C)   Resp 26   Ht 3' 3.96\" (1.015 m)   Wt 14.2 kg (31 lb 4.9 oz)   BMI 13.78 kg/m²   BSA 0.63 m²   BMI Percentile: 9.8 %ile (Z= -1.29) based on CDC (Girls, 2-20 Years) BMI-for-age based on BMI available as of 5/7/2024.      Physical Exam  PHYSICAL EXAM:    VITAL SIGNS: Visit Vitals  BP 95/55   Pulse 109   Temp 97.6 °F (36.4 °C)   Resp 26   Ht 3' 3.96\" (1.015 m)   Wt 14.2 kg (31 lb 4.9 oz)   BMI 13.78 kg/m²    1 %ile (Z= -2.25) based on CDC (Girls, 2-20 Years) weight-for-age data using vitals from 5/7/2024.  GENERAL:  Well appearing female child. Short stature and thin   SKIN:  Warm, normal turgor.  No cyanosis.  No rash.  HEAD:  Normocephalic, atraumatic.    EYES:  Conjunctivae appear normal and positive red reflex. Wears glasses  NOSE:  Appears normal without  drainage.  EARS:  Normal external auditory canals. Tympanic membranes are transparent with normal landmarks.  THROAT:  Moist mucous membranes without lesions.  NECK:  Supple, no lymphadenopathy or masses.  HEART:  Regular rate and rhythm.  Normal S1, S2.  No murmurs, rubs, gallops.   LUNGS:  Clear to auscultation.  No wheezes, rales, rhonchi.  Normal work effort with breathing.  ABDOMEN:  Bowel sounds present. Soft, nontender.  No hepatomegaly.  No splenomegaly.  No masses.  GENITOURINARY: Pipe stage 1. Normal female genitalia.  EXTREMITIES: Normal bilateral range of motion of upper and lower extremities. Peripheral pulses 2/4. Equal femoral pulses.  BACK: Spine straight.   NEUROLOGIC:  Normal muscle tone and symmetrical strength. Symmetrical facial motor function.   Assessment   1. Encounter for well child visit at 5 years of age  Continue to monitor her growth and weight gain   - COVID MODERNA 6M-11Y([id number])    2. COVID-19 vaccine administered       Screening tests  Developmental milestones were reviewed and were: normal based on age.    Counseling  Nutrition/Weight Management:  Assessment and discussion of current Nutrition behaviors performed:  Yes  Assessment and discussion of current Physical Activity behaviors performed:   Yes    Immunizations: per orders.  History of previous adverse reactions to immunizations? no  Immunizations given today and vaccine counseling including benefits, risks, and adverse reactions were provided by myself during the visit.    School form was not provided at today's visit    Follow-up yearly for a well visit, or sooner as needed.     38y  at 39w6d GA by LMP consistent with trimester sono who presents to L&D for elective induction of labor.  - Exam seen as above  - Membranes intact  - Start Pitocin for induction of labor

## 2025-07-02 NOTE — OB PROVIDER LABOR PROGRESS NOTE - NS_OBIHICONTRACTIONPATTERNDETAILS_OBGYN_ALL_OB_FT
I have reviewed the H & P that is linked to this encounter and examined the patient.  There are no significant changes.
q2min
Q 2-3
q2-3 min